# Patient Record
Sex: MALE | Race: WHITE
[De-identification: names, ages, dates, MRNs, and addresses within clinical notes are randomized per-mention and may not be internally consistent; named-entity substitution may affect disease eponyms.]

---

## 2018-03-09 ENCOUNTER — HOSPITAL ENCOUNTER (EMERGENCY)
Dept: HOSPITAL 56 - MW.ED | Age: 18
Discharge: HOME | End: 2018-03-09
Payer: SELF-PAY

## 2018-03-09 DIAGNOSIS — F17.210: ICD-10-CM

## 2018-03-09 DIAGNOSIS — K52.9: Primary | ICD-10-CM

## 2018-03-09 LAB
CHLORIDE SERPL-SCNC: 103 MMOL/L (ref 98–107)
SODIUM SERPL-SCNC: 140 MMOL/L (ref 136–148)

## 2018-03-09 PROCEDURE — 80053 COMPREHEN METABOLIC PANEL: CPT

## 2018-03-09 PROCEDURE — 85025 COMPLETE CBC W/AUTO DIFF WBC: CPT

## 2018-03-09 PROCEDURE — 99283 EMERGENCY DEPT VISIT LOW MDM: CPT

## 2018-03-09 PROCEDURE — 36415 COLL VENOUS BLD VENIPUNCTURE: CPT

## 2018-03-09 PROCEDURE — 83690 ASSAY OF LIPASE: CPT

## 2018-03-09 NOTE — EDM.PDOC
ED HPI GENERAL MEDICAL PROBLEM





- General


Chief Complaint: Gastrointestinal Problem


Stated Complaint: VOMITING, ABDOMINAL PAIN


Time Seen by Provider: 03/09/18 07:05


Source of Information: Reports: Patient


History Limitations: Reports: No Limitations





- History of Present Illness


INITIAL COMMENTS - FREE TEXT/NARRATIVE: 


History of present illness:


[]Patient started having abdominal pain, nonbloody vomiting, diarrhea and 

chills last night. He woke up this morning with worse vomiting and worsening 

diffuse crampy abdominal pain. He denies any fevers. Patient has had an 

appendectomy in the past.





Review of systems: 


As per history of present illness and below otherwise all systems reviewed and 

negative.





Past medical history: 


As per history of present illness and as reviewed below otherwise 

noncontributory.





Surgical history: 


As per history of present illness and as reviewed below otherwise 

noncontributory.





Social history: 


No reported history of drug or alcohol abuse.





Family history: 


As per history of present illness and as reviewed below otherwise 

noncontributory.





Physical exam:


General: Well developed, well nourished in NAD


HEENT: Atraumatic, normocephalic, pupils reactive, negative for conjunctival 

pallor or scleral icterus, mucous membranes moist, throat clear, neck supple, 

nontender, trachea midline.


Lungs: Clear to auscultation, breath sounds equal bilaterally, chest nontender.


Heart: S1S2, regular, negative for clicks, rubs, or JVD.


Abdomen: Soft, nondistended, diffuse tenderness without rebound or guarding. 

Negative for masses or hepatosplenomegaly. Negative for costovertebral 

tenderness.


Pelvis: Stable nontender.


Genitourinary: Deferred.


Rectal: Deferred.


Extremities: Atraumatic, negative for cords or calf pain. Neurovascular 

unremarkable.


Neuro: Awake, alert, oriented. Cranial nerves II through XII unremarkable. 

Cerebellum unremarkable. Motor and sensory unremarkable throughout. Exam 

nonfocal.





Diagnostics:


[]CBC and chemistry are both normal





Therapeutics:


[]Zofran by mouth given tolerated by mouth challenge





Impression: 


[]Acute gastroenteritis





Plan:


[]Zofran, increase fluids, follow up with PMD





Definitive disposition and diagnosis as appropriate pending reevaluation and 

review of above.





  ** Abdomen


Pain Score (Numeric/FACES): 7





- Related Data


 Allergies











Allergy/AdvReac Type Severity Reaction Status Date / Time


 


No Known Allergies Allergy   Verified 03/09/18 07:09











Home Meds: 


 Home Meds





Ondansetron HCl [Zofran] 4 mg PO Q4HR #12 tablet 03/09/18 [Rx]











Past Medical History





- Infectious Disease History


Infectious Disease History: Reports: Chicken Pox





- Past Surgical History


GI Surgical History: Reports: Appendectomy





Social & Family History





- Family History


Family Medical History: Noncontributory





- Tobacco Use


Smoking Status *Q: Current Every Day Smoker


Years of Tobacco use: 2


Packs/Tins Daily: 1





- Caffeine Use


Caffeine Use: Reports: Soda





- Recreational Drug Use


Recreational Drug Use: No





ED ROS GENERAL





- Review of Systems


Review Of Systems: See Below (See history of present illness)





ED EXAM, GI/ABD





- Physical Exam


Exam: See Below (See history of present illness)





Course





- Vital Signs


Last Recorded V/S: 


 Last Vital Signs











Temp  96.7 F   03/09/18 07:06


 


Pulse  74   03/09/18 07:06


 


Resp  20   03/09/18 07:06


 


BP  104/46 L  03/09/18 07:06


 


Pulse Ox  100   03/09/18 07:06














- Orders/Labs/Meds


Labs: 


 Laboratory Tests











  03/09/18 03/09/18 Range/Units





  07:30 07:30 


 


WBC  10.55   (4.0-11.0)  K/uL


 


RBC  5.19   (4.50-5.90)  M/uL


 


Hgb  16.5   (13.0-17.0)  g/dL


 


Hct  45.1   (38.0-50.0)  %


 


MCV  86.9   (80.0-98.0)  fL


 


MCH  31.8   (27.0-32.0)  pg


 


MCHC  36.6   (31.0-37.0)  g/dL


 


RDW Std Deviation  41.1   (28.0-62.0)  fl


 


RDW Coeff of Jaya  13   (11.0-15.0)  %


 


Plt Count  234   (150-400)  K/uL


 


MPV  9.90   (7.40-12.00)  fL


 


Neut % (Auto)  83.4 H   (48.0-80.0)  %


 


Lymph % (Auto)  8.8 L   (16.0-40.0)  %


 


Mono % (Auto)  7.1   (0.0-15.0)  %


 


Eos % (Auto)  0.4   (0.0-7.0)  %


 


Baso % (Auto)  0.3   (0.0-1.5)  %


 


Neut # (Auto)  8.8 H   (1.4-5.7)  K/uL


 


Lymph # (Auto)  0.9   (0.6-2.4)  K/uL


 


Mono # (Auto)  0.8   (0.0-0.8)  K/uL


 


Eos # (Auto)  0.0   (0.0-0.7)  K/uL


 


Baso # (Auto)  0.0   (0.0-0.1)  K/uL


 


Nucleated RBC %  0.0   /100WBC


 


Nucleated RBCs #  0   K/uL


 


Sodium   140  (136-148)  mmol/L


 


Potassium   3.6  (3.5-5.1)  mmol/L


 


Chloride   103  ()  mmol/L


 


Carbon Dioxide   23.0  (21.0-32.0)  mmol/L


 


BUN   16  (7.0-18.0)  mg/dL


 


Creatinine   0.9  (0.8-1.3)  mg/dL


 


Est Cr Clr Drug Dosing   121.48  mL/min


 


Estimated GFR (MDRD)   > 60.0  ml/min


 


Glucose   127 H  ()  mg/dL


 


Calcium   9.8  (8.5-10.1)  mg/dL


 


Total Bilirubin   1.1 H  (0.2-1.0)  mg/dL


 


AST   19  (15-37)  IU/L


 


ALT   25  (14-63)  IU/L


 


Alkaline Phosphatase   65  ()  U/L


 


Total Protein   7.5  (6.4-8.2)  g/dL


 


Albumin   4.7  (3.4-5.0)  g/dL


 


Globulin   2.8  (2.0-3.5)  g/dL


 


Albumin/Globulin Ratio   1.7  (1.3-2.8)  


 


Lipase   84  ()  U/L











Meds: 


Medications














Discontinued Medications














Generic Name Dose Route Start Last Admin





  Trade Name Freq  PRN Reason Stop Dose Admin


 


Ondansetron HCl  4 mg  03/09/18 07:23  03/09/18 07:35





  Zofran Odt  PO  03/09/18 07:24  4 mg





  ONETIME ONE   Administration














Departure





- Departure


Time of Disposition: 08:23


Disposition: Home, Self-Care 01


Condition: Good


Clinical Impression: 


 Acute gastroenteritis








- Discharge Information


Prescriptions: 


Ondansetron HCl [Zofran] 4 mg PO Q4HR #12 tablet


Referrals: 


PCP,None [Primary Care Provider] - 


Forms:  ED Department Discharge


Additional Instructions: 


The following information is given to patients seen in the emergency department 

who are being discharged to home. This information is to outline your options 

for follow-up care. We provide all patients seen in our emergency department 

with a follow-up referral.





The need for follow-up, as well as the timing and circumstances, are variable 

depending upon the specifics of your emergency department visit.





If you don't have a primary care physician on staff, we will provide you with a 

referral. We always advise you to contact your personal physician following an 

emergency department visit to inform them of the circumstance of the visit and 

for follow-up with them and/or the need for any referrals to a consulting 

specialist.





The emergency department will also refer you to a specialist when appropriate. 

This referral assures that you have the opportunity for follow-up care with a 

specialist. All of these measure are taken in an effort to provide you with 

optimal care, which includes your follow-up.





Under all circumstances we always encourage you to contact your private 

physician who remains a resource for coordinating your care. When calling for 

follow-up care, please make the office aware that this follow-up is from your 

recent emergency room visit. If for any reason you are refused follow-up, 

please contact the [facility] Emergency 

Department at [phone] and asked to speak to the emergency department 

charge nurse.





[facility]


Primary Care


[address] [place] [id number]


Phone: [phone]


Fax: [phone]